# Patient Record
Sex: MALE | Race: WHITE | NOT HISPANIC OR LATINO | Employment: UNEMPLOYED | URBAN - METROPOLITAN AREA
[De-identification: names, ages, dates, MRNs, and addresses within clinical notes are randomized per-mention and may not be internally consistent; named-entity substitution may affect disease eponyms.]

---

## 2020-07-07 ENCOUNTER — HOSPITAL ENCOUNTER (EMERGENCY)
Facility: HOSPITAL | Age: 45
Discharge: HOME | End: 2020-07-07
Attending: EMERGENCY MEDICINE | Admitting: EMERGENCY MEDICINE
Payer: COMMERCIAL

## 2020-07-07 ENCOUNTER — APPOINTMENT (EMERGENCY)
Dept: CT IMAGING | Facility: HOSPITAL | Age: 45
End: 2020-07-07
Payer: COMMERCIAL

## 2020-07-07 VITALS
RESPIRATION RATE: 18 BRPM | HEART RATE: 71 BPM | TEMPERATURE: 98.4 F | HEIGHT: 68 IN | BODY MASS INDEX: 28.03 KG/M2 | SYSTOLIC BLOOD PRESSURE: 125 MMHG | OXYGEN SATURATION: 100 % | WEIGHT: 184.97 LBS | DIASTOLIC BLOOD PRESSURE: 75 MMHG

## 2020-07-07 DIAGNOSIS — R19.7 DIARRHEA: ICD-10-CM

## 2020-07-07 DIAGNOSIS — R11.2 NAUSEA AND VOMITING: Primary | ICD-10-CM

## 2020-07-07 DIAGNOSIS — D72.829 LEUKOCYTOSIS: ICD-10-CM

## 2020-07-07 DIAGNOSIS — Z87.898 HISTORY OF METHYLENEDIOXYMETHAMPHETAMINE (MDMA) USE: ICD-10-CM

## 2020-07-07 DIAGNOSIS — E87.6 HYPOKALEMIA: ICD-10-CM

## 2020-07-07 DIAGNOSIS — K52.9 ENTERITIS: ICD-10-CM

## 2020-07-07 DIAGNOSIS — R10.13 EPIGASTRIC ABDOMINAL PAIN: ICD-10-CM

## 2020-07-07 LAB
ALBUMIN SERPL BCP-MCNC: 4.4 G/DL (ref 3.5–5)
ALP SERPL-CCNC: 112 U/L (ref 46–116)
ALT SERPL W P-5'-P-CCNC: 23 U/L (ref 12–78)
ANION GAP SERPL CALCULATED.3IONS-SCNC: 13 MMOL/L (ref 4–13)
AST SERPL W P-5'-P-CCNC: 18 U/L (ref 5–45)
BASOPHILS # BLD AUTO: 0.03 THOUSANDS/ΜL (ref 0–0.1)
BASOPHILS NFR BLD AUTO: 0 % (ref 0–1)
BILIRUB DIRECT SERPL-MCNC: 0.13 MG/DL (ref 0–0.2)
BILIRUB SERPL-MCNC: 0.5 MG/DL (ref 0.2–1)
BUN SERPL-MCNC: 16 MG/DL (ref 5–25)
CALCIUM SERPL-MCNC: 9.6 MG/DL (ref 8.3–10.1)
CHLORIDE SERPL-SCNC: 104 MMOL/L (ref 100–108)
CO2 SERPL-SCNC: 24 MMOL/L (ref 21–32)
CREAT SERPL-MCNC: 1.24 MG/DL (ref 0.6–1.3)
EOSINOPHIL # BLD AUTO: 0 THOUSAND/ΜL (ref 0–0.61)
EOSINOPHIL NFR BLD AUTO: 0 % (ref 0–6)
ERYTHROCYTE [DISTWIDTH] IN BLOOD BY AUTOMATED COUNT: 13.1 % (ref 11.6–15.1)
GFR SERPL CREATININE-BSD FRML MDRD: 70 ML/MIN/1.73SQ M
GLUCOSE SERPL-MCNC: 162 MG/DL (ref 65–140)
HCT VFR BLD AUTO: 47.9 % (ref 36.5–49.3)
HGB BLD-MCNC: 16.1 G/DL (ref 12–17)
IMM GRANULOCYTES # BLD AUTO: 0.09 THOUSAND/UL (ref 0–0.2)
IMM GRANULOCYTES NFR BLD AUTO: 0 % (ref 0–2)
LIPASE SERPL-CCNC: 165 U/L (ref 73–393)
LYMPHOCYTES # BLD AUTO: 1.7 THOUSANDS/ΜL (ref 0.6–4.47)
LYMPHOCYTES NFR BLD AUTO: 8 % (ref 14–44)
MCH RBC QN AUTO: 30.3 PG (ref 26.8–34.3)
MCHC RBC AUTO-ENTMCNC: 33.6 G/DL (ref 31.4–37.4)
MCV RBC AUTO: 90 FL (ref 82–98)
MONOCYTES # BLD AUTO: 1.2 THOUSAND/ΜL (ref 0.17–1.22)
MONOCYTES NFR BLD AUTO: 6 % (ref 4–12)
NEUTROPHILS # BLD AUTO: 17.19 THOUSANDS/ΜL (ref 1.85–7.62)
NEUTS SEG NFR BLD AUTO: 86 % (ref 43–75)
NRBC BLD AUTO-RTO: 0 /100 WBCS
PLATELET # BLD AUTO: 408 THOUSANDS/UL (ref 149–390)
PMV BLD AUTO: 10 FL (ref 8.9–12.7)
POTASSIUM SERPL-SCNC: 3 MMOL/L (ref 3.5–5.3)
PROT SERPL-MCNC: 8.7 G/DL (ref 6.4–8.2)
RBC # BLD AUTO: 5.32 MILLION/UL (ref 3.88–5.62)
SODIUM SERPL-SCNC: 141 MMOL/L (ref 136–145)
WBC # BLD AUTO: 20.21 THOUSAND/UL (ref 4.31–10.16)

## 2020-07-07 PROCEDURE — 96361 HYDRATE IV INFUSION ADD-ON: CPT

## 2020-07-07 PROCEDURE — 96375 TX/PRO/DX INJ NEW DRUG ADDON: CPT

## 2020-07-07 PROCEDURE — 85025 COMPLETE CBC W/AUTO DIFF WBC: CPT | Performed by: EMERGENCY MEDICINE

## 2020-07-07 PROCEDURE — 74177 CT ABD & PELVIS W/CONTRAST: CPT

## 2020-07-07 PROCEDURE — 80048 BASIC METABOLIC PNL TOTAL CA: CPT | Performed by: EMERGENCY MEDICINE

## 2020-07-07 PROCEDURE — 36415 COLL VENOUS BLD VENIPUNCTURE: CPT | Performed by: EMERGENCY MEDICINE

## 2020-07-07 PROCEDURE — 83690 ASSAY OF LIPASE: CPT | Performed by: EMERGENCY MEDICINE

## 2020-07-07 PROCEDURE — 80076 HEPATIC FUNCTION PANEL: CPT | Performed by: EMERGENCY MEDICINE

## 2020-07-07 PROCEDURE — 99284 EMERGENCY DEPT VISIT MOD MDM: CPT | Performed by: EMERGENCY MEDICINE

## 2020-07-07 PROCEDURE — 99284 EMERGENCY DEPT VISIT MOD MDM: CPT

## 2020-07-07 PROCEDURE — 96374 THER/PROPH/DIAG INJ IV PUSH: CPT

## 2020-07-07 PROCEDURE — C9113 INJ PANTOPRAZOLE SODIUM, VIA: HCPCS | Performed by: EMERGENCY MEDICINE

## 2020-07-07 RX ORDER — PANTOPRAZOLE SODIUM 40 MG/1
40 INJECTION, POWDER, FOR SOLUTION INTRAVENOUS ONCE
Status: COMPLETED | OUTPATIENT
Start: 2020-07-07 | End: 2020-07-07

## 2020-07-07 RX ORDER — ONDANSETRON 4 MG/1
4 TABLET, ORALLY DISINTEGRATING ORAL EVERY 6 HOURS PRN
Qty: 20 TABLET | Refills: 0 | Status: SHIPPED | OUTPATIENT
Start: 2020-07-07

## 2020-07-07 RX ORDER — KETOROLAC TROMETHAMINE 30 MG/ML
15 INJECTION, SOLUTION INTRAMUSCULAR; INTRAVENOUS ONCE
Status: COMPLETED | OUTPATIENT
Start: 2020-07-07 | End: 2020-07-07

## 2020-07-07 RX ORDER — METOCLOPRAMIDE HYDROCHLORIDE 5 MG/ML
10 INJECTION INTRAMUSCULAR; INTRAVENOUS ONCE
Status: COMPLETED | OUTPATIENT
Start: 2020-07-07 | End: 2020-07-07

## 2020-07-07 RX ADMIN — PANTOPRAZOLE SODIUM 40 MG: 40 INJECTION, POWDER, FOR SOLUTION INTRAVENOUS at 14:31

## 2020-07-07 RX ADMIN — KETOROLAC TROMETHAMINE 15 MG: 30 INJECTION, SOLUTION INTRAMUSCULAR at 14:31

## 2020-07-07 RX ADMIN — SODIUM CHLORIDE 1000 ML: 0.9 INJECTION, SOLUTION INTRAVENOUS at 16:38

## 2020-07-07 RX ADMIN — SODIUM CHLORIDE 1000 ML: 0.9 INJECTION, SOLUTION INTRAVENOUS at 14:30

## 2020-07-07 RX ADMIN — METOCLOPRAMIDE HYDROCHLORIDE 10 MG: 5 INJECTION INTRAMUSCULAR; INTRAVENOUS at 16:40

## 2020-07-07 RX ADMIN — IOHEXOL 100 ML: 350 INJECTION, SOLUTION INTRAVENOUS at 16:26

## 2020-07-07 NOTE — DISCHARGE INSTRUCTIONS
Drink plenty of fluids, and eat as you are able to tolerate  Take the nausea medication as needed for nausea and vomiting  Take ibuprofen (Motrin, Advil) or acetaminophen (Tylenol) as needed for pain, as per the instructions, as well as your antacid medications  Follow-up with your primary care doctor to make sure you continue to do better  Return if you are unable tolerate fluids despite the nausea medications, or for any concerns

## 2020-07-07 NOTE — ED PROVIDER NOTES
History  Chief Complaint   Patient presents with    Abdominal Pain     pt reports having abdominal pain with nausea, vomiting, and diarrhea that started yesterday     HPI    None       History reviewed  No pertinent past medical history  Past Surgical History:   Procedure Laterality Date    ANKLE SURGERY Right     BACK SURGERY      HERNIA REPAIR         History reviewed  No pertinent family history  I have reviewed and agree with the history as documented  E-Cigarette/Vaping     E-Cigarette/Vaping Substances     Social History     Tobacco Use    Smoking status: Current Every Day Smoker     Packs/day: 1 00     Types: Cigarettes    Smokeless tobacco: Current User   Substance Use Topics    Alcohol use: Not Currently    Drug use: Never       Review of Systems    Physical Exam  Physical Exam   Constitutional: He is oriented to person, place, and time  He appears well-developed and well-nourished  No distress  HENT:   Head: Normocephalic and atraumatic  Mouth/Throat: Oropharynx is clear and moist    Eyes: Pupils are equal, round, and reactive to light  Conjunctivae are normal    Neck: Normal range of motion  No tracheal deviation present  Cardiovascular: Normal rate, regular rhythm, normal heart sounds and intact distal pulses  Pulmonary/Chest: Effort normal and breath sounds normal  No respiratory distress  Abdominal: Soft  Bowel sounds are normal  He exhibits no distension  There is tenderness (moderate) in the epigastric area  There is no rigidity, no rebound, no guarding and negative Wynne's sign  Neurological: He is alert and oriented to person, place, and time  GCS eye subscore is 4  GCS verbal subscore is 5  GCS motor subscore is 6  Skin: Skin is warm and dry  Psychiatric: He has a normal mood and affect  His behavior is normal    Nursing note and vitals reviewed        Vital Signs  ED Triage Vitals [07/07/20 1347]   Temperature Pulse Respirations Blood Pressure SpO2   98 4 °F (36 9 °C) 93 19 134/73 99 %      Temp Source Heart Rate Source Patient Position - Orthostatic VS BP Location FiO2 (%)   Oral Monitor Sitting Left arm --      Pain Score       7           Vitals:    07/07/20 1615 07/07/20 1700 07/07/20 1730 07/07/20 1800   BP: 120/73 109/55 119/71 125/75   Pulse: 68 88 76 71   Patient Position - Orthostatic VS: Lying Lying Lying Lying         Visual Acuity      ED Medications  Medications   sodium chloride 0 9 % bolus 1,000 mL (0 mL Intravenous Stopped 7/7/20 1610)   ketorolac (TORADOL) injection 15 mg (15 mg Intravenous Given 7/7/20 1431)   pantoprazole (PROTONIX) injection 40 mg (40 mg Intravenous Given 7/7/20 1431)   sodium chloride 0 9 % bolus 1,000 mL (0 mL Intravenous Stopped 7/7/20 1815)   metoclopramide (REGLAN) injection 10 mg (10 mg Intravenous Given 7/7/20 1640)   iohexol (OMNIPAQUE) 350 MG/ML injection (MULTI-DOSE) 100 mL (100 mL Intravenous Given 7/7/20 1626)       Diagnostic Studies  Results Reviewed     Procedure Component Value Units Date/Time    Lipase [[id number]]  (Normal) Collected:  07/07/20 1430    Lab Status:  Final result Specimen:  Blood from Arm, Right Updated:  07/07/20 1506     Lipase 165 u/L     Basic metabolic panel [[id number]]  (Abnormal) Collected:  07/07/20 1430    Lab Status:  Final result Specimen:  Blood from Arm, Right Updated:  07/07/20 1506     Sodium 141 mmol/L      Potassium 3 0 mmol/L      Chloride 104 mmol/L      CO2 24 mmol/L      ANION GAP 13 mmol/L      BUN 16 mg/dL      Creatinine 1 24 mg/dL      Glucose 162 mg/dL      Calcium 9 6 mg/dL      eGFR 70 ml/min/1 73sq m     Narrative:       [place] guidelines for Chronic Kidney Disease (CKD):     Stage 1 with normal or high GFR (GFR > 90 mL/min/1 73 square meters)    Stage 2 Mild CKD (GFR = 60-89 mL/min/1 73 square meters)    Stage 3A Moderate CKD (GFR = 45-59 mL/min/1 73 square meters)    Stage 3B Moderate CKD (GFR = 30-44 mL/min/1 73 square meters)    Stage 4 Severe CKD (GFR = 15-29 mL/min/1 73 square meters)    Stage 5 End Stage CKD (GFR <15 mL/min/1 73 square meters)  Note: GFR calculation is accurate only with a steady state creatinine    Hepatic function panel [[id number]]  (Abnormal) Collected:  07/07/20 1430    Lab Status:  Final result Specimen:  Blood from Arm, Right Updated:  07/07/20 1506     Total Bilirubin 0 50 mg/dL      Bilirubin, Direct 0 13 mg/dL      Alkaline Phosphatase 112 U/L      AST 18 U/L      ALT 23 U/L      Total Protein 8 7 g/dL      Albumin 4 4 g/dL     CBC and differential [[id number]]  (Abnormal) Collected:  07/07/20 1430    Lab Status:  Final result Specimen:  Blood from Arm, Right Updated:  07/07/20 1440     WBC 20 21 Thousand/uL      RBC 5 32 Million/uL      Hemoglobin 16 1 g/dL      Hematocrit 47 9 %      MCV 90 fL      MCH 30 3 pg      MCHC 33 6 g/dL      RDW 13 1 %      MPV 10 0 fL      Platelets 602 Thousands/uL      nRBC 0 /100 WBCs      Neutrophils Relative 86 %      Immat GRANS % 0 %      Lymphocytes Relative 8 %      Monocytes Relative 6 %      Eosinophils Relative 0 %      Basophils Relative 0 %      Neutrophils Absolute 17 19 Thousands/µL      Immature Grans Absolute 0 09 Thousand/uL      Lymphocytes Absolute 1 70 Thousands/µL      Monocytes Absolute 1 20 Thousand/µL      Eosinophils Absolute 0 00 Thousand/µL      Basophils Absolute 0 03 Thousands/µL                  CT abdomen pelvis with contrast   Final Result by Elham Carrasco MD (07/07 1726)      Mild diffuse small bowel distention and mucosal hyperemia in keeping with a nonspecific enteritis, likely infectious or inflammatory  No terminal ileal thickening  No bowel obstruction  No bowel pneumatosis  The study was marked in [facility] for immediate notification  Workstation performed: [id number]                    Procedures  Procedures         ED Course       US AUDIT      Most Recent Value   Initial Alcohol Screen: US AUDIT-C    1   How often do you have a drink containing alcohol?  0 Filed at: 07/07/2020 1350   2  How many drinks containing alcohol do you have on a typical day you are drinking? 0 Filed at: 07/07/2020 1350   3a  Male UNDER 65: How often do you have five or more drinks on one occasion? 0 Filed at: 07/07/2020 1350   3b  FEMALE Any Age, or MALE 65+: How often do you have 4 or more drinks on one occassion? 0 Filed at: 07/07/2020 1350   Audit-C Score  0 Filed at: 07/07/2020 1350                  HAIR/DAST-10      Most Recent Value   How many times in the past year have you    Used an illegal drug or used a prescription medication for non-medical reasons? Never Filed at: 07/07/2020 1350                                MDM  Number of Diagnoses or Management Options  Diarrhea: new and requires workup  Enteritis: new and requires workup  Epigastric abdominal pain: new and requires workup  History of methylenedioxymethamphetamine (MDMA) use: new and requires workup  Hypokalemia: new and requires workup  Leukocytosis: new and requires workup  Nausea and vomiting: new and requires workup  Diagnosis management comments: This is a 48-year-old otherwise healthy male who presents here today with epigastric abdominal cramping, nausea, vomiting, diarrhea  He did use Kaelyn last night prior to having sex with his wife, and shortly thereafter began developing symptoms  He states he is having epigastric cramping, but does worsen after vomiting  He has lost track of the number of episodes of vomiting and diarrhea he has had  He denies blood in his stools, pain related to bowel movements, fevers, other infectious symptoms  He denies any known sick contacts or bad food exposures  He has never used [place] before  He does have a history of heroin use but has not used this in years  He denies any other recent drug use  He denies any alcohol use  He denies any prior abdominal surgeries  He has no underlying medical problems    He has no known sick contacts or bad food exposures  He has not taken or done anything for his symptoms  Review of systems:  Otherwise negative unless stated above    He is well-appearing, in no acute distress  He does have mild epigastric tenderness to palpation without peritoneal signs  Exam is otherwise unremarkable  This is most likely drug-induced symptoms, possibly infectious or food-borne enteritis  Given as symptoms began after drug use and he is only having mild pain my suspicion for acute process such as pancreatitis, cholelithiasis/cholecystitis, colitis, diverticulitis is much less  We will start by treating his symptoms, checking lab work to evaluate for acute abnormalities, signs of dehydration or electrolyte abnormalities contributing to or caused by his symptoms  Unless he has significant lab abnormalities or we are unable to control his symptoms, I do not feel that he needs any imaging  The patient is in agreement with this plan and holding off on CT scan  Lab work shows moderate leukocytosis of 20, however he does have high normal hemoglobin and a thrombocytosis of 408  This could reflect acute infection, or could be acute inflammatory response secondary to his vomiting diarrhea, verses mild dehydration with hemoconcentration is  He has mild hypokalemia of three, likely secondary to his diarrhea  Labs are otherwise unremarkable  He is still having some cramping though it has improved  Did have resolution of his nausea but it is recurring, and has not vomited again but has had dry heaves  Given persistent symptoms and degree of elevation of his white count, we will get a CT scan to evaluate further, and continue to treat his symptoms  CT scan shows findings suggestive of enteritis with no other acute abnormalities  He feels much better after additional medications, with resolution of pain and nausea  He is able to tolerate fluids without recurrent vomiting    I discussed with the patient his wife continued treatment at home, avoidance of MDMA in the future, follow-up with his PCP, and indications for return, and he expresses understanding with this plan  Amount and/or Complexity of Data Reviewed  Clinical lab tests: ordered and reviewed  Tests in the radiology section of CPT®: ordered and reviewed  Independent visualization of images, tracings, or specimens: yes          Disposition  Final diagnoses:   Nausea and vomiting   Diarrhea   Epigastric abdominal pain   Enteritis   Leukocytosis   Hypokalemia   History of methylenedioxymethamphetamine (MDMA) use     Time reflects when diagnosis was documented in both MDM as applicable and the Disposition within this note     Time User Action Codes Description Comment    7/7/2020  6:09 PM Ann-Tesoriero, Jailyn Life Add [R11 2] Nausea and vomiting     7/7/2020  6:09 PM Ann-Tesoriero, Jailyn Life Add [R19 7] Diarrhea     7/7/2020  6:09 PM Ann-Tesoriero, Jailyn Life Add [R10 13] Epigastric abdominal pain     7/7/2020  6:09 PM Ann-Tesoriero, Jailyn Life Add [K52 9] Enteritis     7/7/2020  6:09 PM Ann-Tesoriero, Jailyn Life Add [D72 829] Leukocytosis     7/7/2020  6:10 PM Ann-Tesoriero, Jailyn Life Add [E87 6] Hypokalemia     7/7/2020  6:10 PM Ann-Tesoriero, Jailyn Life Add [L60 390] History of methylenedioxymethamphetamine (MDMA) use       ED Disposition     ED Disposition Condition Date/Time Comment    Discharge Good Tu Jul 7, 2020  6:09 PM Aparna Allan discharge to home/self care        Follow-up Information     Follow up With Specialties Details Why Contact Info    your primary care doctor  Schedule an appointment as soon as possible for a visit in 3 days As needed, to follow up on your symptoms           Patient's Medications   Discharge Prescriptions    ONDANSETRON (ZOFRAN-ODT) 4 MG DISINTEGRATING TABLET    Take 1 tablet (4 mg total) by mouth every 6 (six) hours as needed for nausea or vomiting       Start Date: 7/7/2020  End Date: --       Order Dose: 4 mg Quantity: 20 tablet    Refills: 0     No discharge procedures on file      PDMP Review     None          ED Provider  Electronically Signed by           Jen Scanlon MD  07/07/20 8723

## 2020-07-07 NOTE — ED NOTES
Pt discharged instructions reviewed  Pt has no new concerns or complaints   Pt ambulated with wife steady gait, steady vss     Celina Alfaro  07/07/20 1789